# Patient Record
Sex: FEMALE | Race: WHITE | NOT HISPANIC OR LATINO | Employment: FULL TIME | URBAN - METROPOLITAN AREA
[De-identification: names, ages, dates, MRNs, and addresses within clinical notes are randomized per-mention and may not be internally consistent; named-entity substitution may affect disease eponyms.]

---

## 2023-08-28 ENCOUNTER — OFFICE VISIT (OUTPATIENT)
Dept: NEUROSURGERY | Facility: CLINIC | Age: 63
End: 2023-08-28
Payer: COMMERCIAL

## 2023-08-28 VITALS
WEIGHT: 247.4 LBS | DIASTOLIC BLOOD PRESSURE: 88 MMHG | BODY MASS INDEX: 39.76 KG/M2 | HEART RATE: 88 BPM | HEIGHT: 66 IN | SYSTOLIC BLOOD PRESSURE: 154 MMHG | TEMPERATURE: 96.9 F

## 2023-08-28 DIAGNOSIS — M54.16 LUMBAR RADICULOPATHY, ACUTE: ICD-10-CM

## 2023-08-28 DIAGNOSIS — M54.42 ACUTE BILATERAL LOW BACK PAIN WITH LEFT-SIDED SCIATICA: Primary | ICD-10-CM

## 2023-08-28 DIAGNOSIS — M54.16 LUMBAR RADICULOPATHY, ACUTE: Primary | ICD-10-CM

## 2023-08-28 RX ORDER — PHENYTOIN SODIUM 100 MG/1
CAPSULE, EXTENDED RELEASE ORAL
COMMUNITY

## 2023-08-28 RX ORDER — GABAPENTIN 300 MG/1
300 CAPSULE ORAL 3 TIMES DAILY
Qty: 90 CAPSULE | Refills: 0 | Status: SHIPPED | OUTPATIENT
Start: 2023-08-28

## 2023-08-28 RX ORDER — ATORVASTATIN CALCIUM 10 MG/1
10 TABLET, FILM COATED ORAL DAILY
COMMUNITY

## 2023-08-28 RX ORDER — MULTIPLE VITAMINS W/ MINERALS TAB 9MG-400MCG
TAB ORAL DAILY
COMMUNITY

## 2023-08-28 RX ORDER — LISINOPRIL 10 MG/1
10 TABLET ORAL
COMMUNITY
Start: 2023-02-21

## 2023-08-28 RX ORDER — METHOCARBAMOL 500 MG/1
TABLET, FILM COATED ORAL
COMMUNITY
Start: 2023-07-30

## 2023-08-28 RX ORDER — GABAPENTIN 100 MG/1
CAPSULE ORAL
COMMUNITY
Start: 2023-08-04 | End: 2023-08-28

## 2023-08-28 NOTE — PROGRESS NOTES
"  Chief complaint: Left L3-L4 radicular pain      Neurosurgical Focus Dx: Lateral disc bulge at level L3-L4        History of present illness:  This is a 62-year-old female who presented with complaints of bilateral low back pain and radiating left leg pain.  This began approximately 4 weeks ago, after she had been doing heavier lifting than normal at work.  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  She tells me that she woke up and was \"sore and tight\" but that it was not horrible.  She then tells me the next day she woke up with terrible pain, this pain is low across the back bilaterally, and is especially severe radiating from the top of the buttock area around the leg near the anterior thigh crease to the inner thigh (which is consistent with an L3 nerve root distribution) she also complains of some pain radiating from the upper buttock across the anterior thigh involving the knee and terminating near that level.  She describes the pain as \"everything, hot, pins-and-needles, painful\".  The pain is rated to be 6 out of 10 on average with exacerbations reading 10 out of 10.  She can experience pain with no movement or other identifiable factors, but is especially worrisome that when she is walking she may place herself in a position which exacerbates the pain.  She has begun using a cane which is new.  Over the subsequent 10 days she was seen in the ED 3 times, and 1 time passed out because of the pain.  She was told she had severe sciatic pain.  But did not receive an MRI scan until approximately 2 weeks after onset.  She then presents here for assessment of the bulging disks found on MRI scan.    Pertinent Medical/Surgical History: Tobacco use.  Obesity at 39 BMI    ROS:  A 12 point review of systems was performed.  All systems reviewed were negative with the exception of those mentioned in the history of present illness.    Past medical history:  Past Medical History:   Diagnosis " Date    Low back pain     Lumbosacral disc disease     Seizures        Past surgical history:  Past Surgical History:   Procedure Laterality Date    APPENDECTOMY  1971    [place]    TUBAL ABDOMINAL LIGATION  1989    [facility]       Social history:  Social History     Socioeconomic History    Marital status:    Tobacco Use    Smoking status: Former     Types: Cigarettes     Quit date: 2013     Years since quitting: 10.6    Smokeless tobacco: Never   Vaping Use    Vaping Use: Never used   Substance and Sexual Activity    Alcohol use: Never    Drug use: Never    Sexual activity: Defer       Family history:  No family history on file.    Allergies:  No Known Allergies    Outpatient medications:  Scheduled Meds:  Continuous Infusions:No current facility-administered medications for this visit.    PRN Meds:.    Physical Examination:    Vitals:    08/28/23 1058   BP: 154/88   Pulse: 88   Temp: 96.9 øF (36.1 øC)         General: The patient is okay at rest and uncomfortable when moving, especially stepping up onto the exam table.  Neurological:  Facial expressions are equal bilaterally.     Eyes: Extraocular eye movements are intact  Musculoskeletal:   Upper extremity strength is 5/5  Lower extremity strength is 5/5  Sensation: Reportedly hot, pins-and-needles, painful from the deep buttock area on the left anteriorly across the upper thigh    Casual gait, heel raise, toe raise, were performed without issue.  Tandem gait testing was performed with some difficulty.        Imaging:  Available for review is an MRI of the lumbar spine, I have reviewed this with Dr. Sim and the assessment is there is a far left disc herniation at level L3-4 which is encroaching on the nerve root exiting at that level.      Assessment and Plan:    -We suggested a transforaminal injection at level L3-4 which can be performed by pain management.  We also endorsed any other suggestions they have to help this patient.  - Referral to PT has been  sent.    -I discussed with the patient all aspects of the plan moving forward.  Of note she is anxious about the thought of pain management or injections to control the pain.  She is willing to attempt PT, and we discussed that her spine is stable and that it is okay to work through moderate discomfort and pain.  Severe pain is contraindicated and should limit her activity whether at PT or at home.  I placed a referral for pain management, however the patient would like time to attempt physical therapy and time to think about whether or not she wants to pursue that action.  But at least it is open should she fail PT and require further treatment.  - She may follow-up with us after attempting PT and pain management.  It should be known that a higher BMI index is correlated with a high risk of surgery.  She is also anxious about the idea of surgery, but I consoled her that this is an option only if conservative measures do not work, and that there is no commitment at this time.      Sandro Aguayo PA-C  08/28/23